# Patient Record
Sex: FEMALE | Race: WHITE | NOT HISPANIC OR LATINO | URBAN - METROPOLITAN AREA
[De-identification: names, ages, dates, MRNs, and addresses within clinical notes are randomized per-mention and may not be internally consistent; named-entity substitution may affect disease eponyms.]

---

## 2024-03-27 DIAGNOSIS — M48.00 SPINAL STENOSIS: ICD-10-CM

## 2024-03-27 DIAGNOSIS — G62.9 NEUROPATHY: Primary | ICD-10-CM

## 2024-04-03 ENCOUNTER — TELEPHONE (OUTPATIENT)
Dept: NEUROSURGERY | Facility: CLINIC | Age: 64
End: 2024-04-03

## 2024-04-03 NOTE — TELEPHONE ENCOUNTER
I received the referral for Dr. Peña for neuropathy. From Dr. Bryant's last telephone encounter, the patient had Medicaid. I called the referring MD's office and spoke with Heidy to verify what insurance she does have. She stated the patients primary is [employer] and secondary is Medicaid. The patient has not had an MRI or CT, just XR's. I will send this to Elsi to advise. If this referral is accepted, we will need to obtain the patients past medical history.